# Patient Record
Sex: MALE | Race: WHITE | NOT HISPANIC OR LATINO | Employment: FULL TIME | URBAN - METROPOLITAN AREA
[De-identification: names, ages, dates, MRNs, and addresses within clinical notes are randomized per-mention and may not be internally consistent; named-entity substitution may affect disease eponyms.]

---

## 2024-11-07 ENCOUNTER — APPOINTMENT (OUTPATIENT)
Dept: CARDIOLOGY | Facility: HOSPITAL | Age: 29
End: 2024-11-07
Payer: COMMERCIAL

## 2024-11-07 ENCOUNTER — HOSPITAL ENCOUNTER (OUTPATIENT)
Facility: HOSPITAL | Age: 29
Setting detail: OBSERVATION
Discharge: HOME | End: 2024-11-08
Attending: EMERGENCY MEDICINE | Admitting: STUDENT IN AN ORGANIZED HEALTH CARE EDUCATION/TRAINING PROGRAM
Payer: COMMERCIAL

## 2024-11-07 ENCOUNTER — APPOINTMENT (OUTPATIENT)
Dept: RADIOLOGY | Facility: HOSPITAL | Age: 29
End: 2024-11-07
Payer: COMMERCIAL

## 2024-11-07 DIAGNOSIS — H55.00 NYSTAGMUS: ICD-10-CM

## 2024-11-07 DIAGNOSIS — R11.2 NAUSEA AND VOMITING, UNSPECIFIED VOMITING TYPE: ICD-10-CM

## 2024-11-07 DIAGNOSIS — R42 VERTIGO: Primary | ICD-10-CM

## 2024-11-07 PROBLEM — R14.0 ABDOMINAL BLOATING: Status: RESOLVED | Noted: 2022-08-22 | Resolved: 2024-11-07

## 2024-11-07 PROBLEM — L98.9 SKIN LESION OF HAND: Status: RESOLVED | Noted: 2021-06-03 | Resolved: 2024-11-07

## 2024-11-07 PROBLEM — R14.3 FLATULENCE: Status: RESOLVED | Noted: 2022-08-22 | Resolved: 2024-11-07

## 2024-11-07 PROBLEM — Z86.69 HX OF MIGRAINES: Status: ACTIVE | Noted: 2024-02-22

## 2024-11-07 PROBLEM — K21.9 GASTROESOPHAGEAL REFLUX DISEASE: Status: RESOLVED | Noted: 2022-08-22 | Resolved: 2024-11-07

## 2024-11-07 LAB
ALBUMIN SERPL BCP-MCNC: 4.7 G/DL (ref 3.4–5)
ALP SERPL-CCNC: 56 U/L (ref 33–120)
ALT SERPL W P-5'-P-CCNC: 13 U/L (ref 10–52)
ANION GAP SERPL CALC-SCNC: 13 MMOL/L (ref 10–20)
APPEARANCE UR: CLEAR
APTT PPP: 27 SECONDS (ref 27–38)
AST SERPL W P-5'-P-CCNC: 20 U/L (ref 9–39)
ATRIAL RATE: 66 BPM
BASOPHILS # BLD AUTO: 0.07 X10*3/UL (ref 0–0.1)
BASOPHILS NFR BLD AUTO: 1.2 %
BILIRUB SERPL-MCNC: 0.9 MG/DL (ref 0–1.2)
BILIRUB UR STRIP.AUTO-MCNC: NEGATIVE MG/DL
BUN SERPL-MCNC: 13 MG/DL (ref 6–23)
CALCIUM SERPL-MCNC: 9.2 MG/DL (ref 8.6–10.3)
CARDIAC TROPONIN I PNL SERPL HS: 3 NG/L (ref 0–20)
CARDIAC TROPONIN I PNL SERPL HS: 4 NG/L (ref 0–20)
CHLORIDE SERPL-SCNC: 104 MMOL/L (ref 98–107)
CO2 SERPL-SCNC: 24 MMOL/L (ref 21–32)
COLOR UR: NORMAL
CREAT SERPL-MCNC: 0.89 MG/DL (ref 0.5–1.3)
EGFRCR SERPLBLD CKD-EPI 2021: >90 ML/MIN/1.73M*2
EOSINOPHIL # BLD AUTO: 0.12 X10*3/UL (ref 0–0.7)
EOSINOPHIL NFR BLD AUTO: 2 %
ERYTHROCYTE [DISTWIDTH] IN BLOOD BY AUTOMATED COUNT: 11.4 % (ref 11.5–14.5)
FLUAV RNA RESP QL NAA+PROBE: NOT DETECTED
FLUBV RNA RESP QL NAA+PROBE: NOT DETECTED
GLUCOSE SERPL-MCNC: 162 MG/DL (ref 74–99)
GLUCOSE UR STRIP.AUTO-MCNC: NORMAL MG/DL
HCT VFR BLD AUTO: 46.1 % (ref 41–52)
HGB BLD-MCNC: 16 G/DL (ref 13.5–17.5)
IMM GRANULOCYTES # BLD AUTO: 0.04 X10*3/UL (ref 0–0.7)
IMM GRANULOCYTES NFR BLD AUTO: 0.7 % (ref 0–0.9)
INR PPP: 1.1 (ref 0.9–1.1)
KETONES UR STRIP.AUTO-MCNC: NEGATIVE MG/DL
LEUKOCYTE ESTERASE UR QL STRIP.AUTO: NEGATIVE
LIPASE SERPL-CCNC: 17 U/L (ref 9–82)
LYMPHOCYTES # BLD AUTO: 2.07 X10*3/UL (ref 1.2–4.8)
LYMPHOCYTES NFR BLD AUTO: 35.2 %
MCH RBC QN AUTO: 30.1 PG (ref 26–34)
MCHC RBC AUTO-ENTMCNC: 34.7 G/DL (ref 32–36)
MCV RBC AUTO: 87 FL (ref 80–100)
MONOCYTES # BLD AUTO: 0.4 X10*3/UL (ref 0.1–1)
MONOCYTES NFR BLD AUTO: 6.8 %
NEUTROPHILS # BLD AUTO: 3.18 X10*3/UL (ref 1.2–7.7)
NEUTROPHILS NFR BLD AUTO: 54.1 %
NITRITE UR QL STRIP.AUTO: NEGATIVE
NRBC BLD-RTO: 0 /100 WBCS (ref 0–0)
P AXIS: 52 DEGREES
P OFFSET: 199 MS
P ONSET: 142 MS
PH UR STRIP.AUTO: 7 [PH]
PLATELET # BLD AUTO: 238 X10*3/UL (ref 150–450)
POTASSIUM SERPL-SCNC: 3.8 MMOL/L (ref 3.5–5.3)
PR INTERVAL: 150 MS
PROT SERPL-MCNC: 7.3 G/DL (ref 6.4–8.2)
PROT UR STRIP.AUTO-MCNC: NEGATIVE MG/DL
PROTHROMBIN TIME: 12.6 SECONDS (ref 9.8–12.8)
Q ONSET: 217 MS
QRS COUNT: 10 BEATS
QRS DURATION: 96 MS
QT INTERVAL: 402 MS
QTC CALCULATION(BAZETT): 421 MS
QTC FREDERICIA: 415 MS
R AXIS: 67 DEGREES
RBC # BLD AUTO: 5.32 X10*6/UL (ref 4.5–5.9)
RBC # UR STRIP.AUTO: NEGATIVE /UL
RSV RNA RESP QL NAA+PROBE: NOT DETECTED
SARS-COV-2 RNA RESP QL NAA+PROBE: NOT DETECTED
SODIUM SERPL-SCNC: 137 MMOL/L (ref 136–145)
SP GR UR STRIP.AUTO: 1.02
T AXIS: 53 DEGREES
T OFFSET: 418 MS
UROBILINOGEN UR STRIP.AUTO-MCNC: NORMAL MG/DL
VENTRICULAR RATE: 66 BPM
WBC # BLD AUTO: 5.9 X10*3/UL (ref 4.4–11.3)

## 2024-11-07 PROCEDURE — 85025 COMPLETE CBC W/AUTO DIFF WBC: CPT | Performed by: EMERGENCY MEDICINE

## 2024-11-07 PROCEDURE — G0378 HOSPITAL OBSERVATION PER HR: HCPCS

## 2024-11-07 PROCEDURE — 2500000005 HC RX 250 GENERAL PHARMACY W/O HCPCS: Performed by: NURSE PRACTITIONER

## 2024-11-07 PROCEDURE — 70450 CT HEAD/BRAIN W/O DYE: CPT

## 2024-11-07 PROCEDURE — 81003 URINALYSIS AUTO W/O SCOPE: CPT

## 2024-11-07 PROCEDURE — 85610 PROTHROMBIN TIME: CPT

## 2024-11-07 PROCEDURE — 99285 EMERGENCY DEPT VISIT HI MDM: CPT | Performed by: EMERGENCY MEDICINE

## 2024-11-07 PROCEDURE — 96361 HYDRATE IV INFUSION ADD-ON: CPT

## 2024-11-07 PROCEDURE — 2500000001 HC RX 250 WO HCPCS SELF ADMINISTERED DRUGS (ALT 637 FOR MEDICARE OP): Performed by: NURSE PRACTITIONER

## 2024-11-07 PROCEDURE — 84484 ASSAY OF TROPONIN QUANT: CPT | Performed by: EMERGENCY MEDICINE

## 2024-11-07 PROCEDURE — 2500000004 HC RX 250 GENERAL PHARMACY W/ HCPCS (ALT 636 FOR OP/ED): Performed by: STUDENT IN AN ORGANIZED HEALTH CARE EDUCATION/TRAINING PROGRAM

## 2024-11-07 PROCEDURE — 2500000004 HC RX 250 GENERAL PHARMACY W/ HCPCS (ALT 636 FOR OP/ED)

## 2024-11-07 PROCEDURE — 96375 TX/PRO/DX INJ NEW DRUG ADDON: CPT

## 2024-11-07 PROCEDURE — 85730 THROMBOPLASTIN TIME PARTIAL: CPT

## 2024-11-07 PROCEDURE — 80053 COMPREHEN METABOLIC PANEL: CPT | Performed by: EMERGENCY MEDICINE

## 2024-11-07 PROCEDURE — 96374 THER/PROPH/DIAG INJ IV PUSH: CPT

## 2024-11-07 PROCEDURE — 83690 ASSAY OF LIPASE: CPT

## 2024-11-07 PROCEDURE — 2500000002 HC RX 250 W HCPCS SELF ADMINISTERED DRUGS (ALT 637 FOR MEDICARE OP, ALT 636 FOR OP/ED)

## 2024-11-07 PROCEDURE — 87637 SARSCOV2&INF A&B&RSV AMP PRB: CPT

## 2024-11-07 PROCEDURE — 99223 1ST HOSP IP/OBS HIGH 75: CPT | Performed by: NURSE PRACTITIONER

## 2024-11-07 PROCEDURE — 2500000004 HC RX 250 GENERAL PHARMACY W/ HCPCS (ALT 636 FOR OP/ED): Performed by: NURSE PRACTITIONER

## 2024-11-07 PROCEDURE — 99285 EMERGENCY DEPT VISIT HI MDM: CPT | Mod: 25

## 2024-11-07 PROCEDURE — 36415 COLL VENOUS BLD VENIPUNCTURE: CPT | Performed by: EMERGENCY MEDICINE

## 2024-11-07 PROCEDURE — 93005 ELECTROCARDIOGRAM TRACING: CPT

## 2024-11-07 PROCEDURE — 2500000002 HC RX 250 W HCPCS SELF ADMINISTERED DRUGS (ALT 637 FOR MEDICARE OP, ALT 636 FOR OP/ED): Performed by: NURSE PRACTITIONER

## 2024-11-07 RX ORDER — ONDANSETRON HYDROCHLORIDE 2 MG/ML
4 INJECTION, SOLUTION INTRAVENOUS EVERY 8 HOURS PRN
Status: DISCONTINUED | OUTPATIENT
Start: 2024-11-07 | End: 2024-11-08 | Stop reason: HOSPADM

## 2024-11-07 RX ORDER — CALCIUM CARBONATE 300MG(750)
400 TABLET,CHEWABLE ORAL DAILY
COMMUNITY

## 2024-11-07 RX ORDER — PROCHLORPERAZINE EDISYLATE 5 MG/ML
10 INJECTION INTRAMUSCULAR; INTRAVENOUS EVERY 6 HOURS PRN
Status: DISCONTINUED | OUTPATIENT
Start: 2024-11-07 | End: 2024-11-08 | Stop reason: HOSPADM

## 2024-11-07 RX ORDER — POLYETHYLENE GLYCOL 3350 17 G/17G
17 POWDER, FOR SOLUTION ORAL DAILY PRN
Status: DISCONTINUED | OUTPATIENT
Start: 2024-11-07 | End: 2024-11-08 | Stop reason: HOSPADM

## 2024-11-07 RX ORDER — ONDANSETRON HYDROCHLORIDE 2 MG/ML
INJECTION, SOLUTION INTRAVENOUS
Status: DISPENSED
Start: 2024-11-07 | End: 2024-11-07

## 2024-11-07 RX ORDER — PROCHLORPERAZINE EDISYLATE 5 MG/ML
10 INJECTION INTRAMUSCULAR; INTRAVENOUS ONCE
Status: DISCONTINUED | OUTPATIENT
Start: 2024-11-07 | End: 2024-11-08 | Stop reason: HOSPADM

## 2024-11-07 RX ORDER — ACETAMINOPHEN 325 MG/1
650 TABLET ORAL EVERY 4 HOURS PRN
Status: DISCONTINUED | OUTPATIENT
Start: 2024-11-07 | End: 2024-11-08 | Stop reason: HOSPADM

## 2024-11-07 RX ORDER — ONDANSETRON 4 MG/1
4 TABLET, FILM COATED ORAL EVERY 8 HOURS PRN
Status: DISCONTINUED | OUTPATIENT
Start: 2024-11-07 | End: 2024-11-08 | Stop reason: HOSPADM

## 2024-11-07 RX ORDER — PREDNISONE 50 MG/1
50 TABLET ORAL DAILY
Status: DISCONTINUED | OUTPATIENT
Start: 2024-11-07 | End: 2024-11-08

## 2024-11-07 RX ORDER — PROCHLORPERAZINE 25 MG/1
25 SUPPOSITORY RECTAL EVERY 12 HOURS PRN
Status: DISCONTINUED | OUTPATIENT
Start: 2024-11-07 | End: 2024-11-08 | Stop reason: HOSPADM

## 2024-11-07 RX ORDER — MECLIZINE HYDROCHLORIDE 25 MG/1
25 TABLET ORAL ONCE
Status: DISCONTINUED | OUTPATIENT
Start: 2024-11-07 | End: 2024-11-07

## 2024-11-07 RX ORDER — ACETAMINOPHEN 160 MG/5ML
650 SOLUTION ORAL EVERY 4 HOURS PRN
Status: DISCONTINUED | OUTPATIENT
Start: 2024-11-07 | End: 2024-11-08 | Stop reason: HOSPADM

## 2024-11-07 RX ORDER — MECLIZINE HYDROCHLORIDE 25 MG/1
25 TABLET ORAL ONCE
Status: COMPLETED | OUTPATIENT
Start: 2024-11-07 | End: 2024-11-07

## 2024-11-07 RX ORDER — LORAZEPAM 0.5 MG/1
0.5 TABLET ORAL EVERY 8 HOURS PRN
Status: DISCONTINUED | OUTPATIENT
Start: 2024-11-07 | End: 2024-11-08

## 2024-11-07 RX ORDER — MECLIZINE HYDROCHLORIDE 25 MG/1
25 TABLET ORAL EVERY 8 HOURS
Status: DISCONTINUED | OUTPATIENT
Start: 2024-11-07 | End: 2024-11-08 | Stop reason: HOSPADM

## 2024-11-07 RX ORDER — PROCHLORPERAZINE MALEATE 10 MG
10 TABLET ORAL EVERY 6 HOURS PRN
Status: DISCONTINUED | OUTPATIENT
Start: 2024-11-07 | End: 2024-11-08 | Stop reason: HOSPADM

## 2024-11-07 RX ORDER — DIAZEPAM 5 MG/ML
2.5 INJECTION, SOLUTION INTRAMUSCULAR; INTRAVENOUS ONCE
Status: COMPLETED | OUTPATIENT
Start: 2024-11-07 | End: 2024-11-07

## 2024-11-07 SDOH — SOCIAL STABILITY: SOCIAL INSECURITY
WITHIN THE LAST YEAR, HAVE YOU BEEN RAPED OR FORCED TO HAVE ANY KIND OF SEXUAL ACTIVITY BY YOUR PARTNER OR EX-PARTNER?: PATIENT DECLINED

## 2024-11-07 SDOH — SOCIAL STABILITY: SOCIAL INSECURITY: WERE YOU ABLE TO COMPLETE ALL THE BEHAVIORAL HEALTH SCREENINGS?: NO

## 2024-11-07 SDOH — SOCIAL STABILITY: SOCIAL INSECURITY: DO YOU FEEL ANYONE HAS EXPLOITED OR TAKEN ADVANTAGE OF YOU FINANCIALLY OR OF YOUR PERSONAL PROPERTY?: NO

## 2024-11-07 SDOH — SOCIAL STABILITY: SOCIAL INSECURITY: DO YOU FEEL UNSAFE GOING BACK TO THE PLACE WHERE YOU ARE LIVING?: NO

## 2024-11-07 SDOH — SOCIAL STABILITY: SOCIAL INSECURITY: WITHIN THE LAST YEAR, HAVE YOU BEEN AFRAID OF YOUR PARTNER OR EX-PARTNER?: PATIENT DECLINED

## 2024-11-07 SDOH — ECONOMIC STABILITY: FOOD INSECURITY: HOW HARD IS IT FOR YOU TO PAY FOR THE VERY BASICS LIKE FOOD, HOUSING, MEDICAL CARE, AND HEATING?: PATIENT DECLINED

## 2024-11-07 SDOH — ECONOMIC STABILITY: HOUSING INSECURITY: AT ANY TIME IN THE PAST 12 MONTHS, WERE YOU HOMELESS OR LIVING IN A SHELTER (INCLUDING NOW)?: PATIENT DECLINED

## 2024-11-07 SDOH — ECONOMIC STABILITY: TRANSPORTATION INSECURITY
IN THE PAST 12 MONTHS, HAS LACK OF TRANSPORTATION KEPT YOU FROM MEDICAL APPOINTMENTS OR FROM GETTING MEDICATIONS?: PATIENT DECLINED

## 2024-11-07 SDOH — ECONOMIC STABILITY: INCOME INSECURITY
IN THE PAST 12 MONTHS HAS THE ELECTRIC, GAS, OIL, OR WATER COMPANY THREATENED TO SHUT OFF SERVICES IN YOUR HOME?: PATIENT DECLINED

## 2024-11-07 SDOH — SOCIAL STABILITY: SOCIAL INSECURITY: ARE THERE ANY APPARENT SIGNS OF INJURIES/BEHAVIORS THAT COULD BE RELATED TO ABUSE/NEGLECT?: NO

## 2024-11-07 SDOH — ECONOMIC STABILITY: HOUSING INSECURITY: IN THE PAST 12 MONTHS, HOW MANY TIMES HAVE YOU MOVED WHERE YOU WERE LIVING?: 0

## 2024-11-07 SDOH — ECONOMIC STABILITY: HOUSING INSECURITY: IN THE LAST 12 MONTHS, WAS THERE A TIME WHEN YOU WERE NOT ABLE TO PAY THE MORTGAGE OR RENT ON TIME?: PATIENT DECLINED

## 2024-11-07 SDOH — SOCIAL STABILITY: SOCIAL INSECURITY: HAVE YOU HAD THOUGHTS OF HARMING ANYONE ELSE?: YES

## 2024-11-07 SDOH — SOCIAL STABILITY: SOCIAL INSECURITY: HAS ANYONE EVER THREATENED TO HURT YOUR FAMILY OR YOUR PETS?: NO

## 2024-11-07 SDOH — ECONOMIC STABILITY: FOOD INSECURITY: WITHIN THE PAST 12 MONTHS, THE FOOD YOU BOUGHT JUST DIDN'T LAST AND YOU DIDN'T HAVE MONEY TO GET MORE.: PATIENT DECLINED

## 2024-11-07 SDOH — SOCIAL STABILITY: SOCIAL INSECURITY: ARE YOU OR HAVE YOU BEEN THREATENED OR ABUSED PHYSICALLY, EMOTIONALLY, OR SEXUALLY BY ANYONE?: NO

## 2024-11-07 SDOH — ECONOMIC STABILITY: FOOD INSECURITY
WITHIN THE PAST 12 MONTHS, YOU WORRIED THAT YOUR FOOD WOULD RUN OUT BEFORE YOU GOT THE MONEY TO BUY MORE.: PATIENT DECLINED

## 2024-11-07 SDOH — SOCIAL STABILITY: SOCIAL INSECURITY: DOES ANYONE TRY TO KEEP YOU FROM HAVING/CONTACTING OTHER FRIENDS OR DOING THINGS OUTSIDE YOUR HOME?: NO

## 2024-11-07 SDOH — SOCIAL STABILITY: SOCIAL INSECURITY
WITHIN THE LAST YEAR, HAVE YOU BEEN KICKED, HIT, SLAPPED, OR OTHERWISE PHYSICALLY HURT BY YOUR PARTNER OR EX-PARTNER?: PATIENT DECLINED

## 2024-11-07 SDOH — SOCIAL STABILITY: SOCIAL INSECURITY: ABUSE: ADULT

## 2024-11-07 SDOH — SOCIAL STABILITY: SOCIAL INSECURITY
WITHIN THE LAST YEAR, HAVE YOU BEEN HUMILIATED OR EMOTIONALLY ABUSED IN OTHER WAYS BY YOUR PARTNER OR EX-PARTNER?: PATIENT DECLINED

## 2024-11-07 ASSESSMENT — PATIENT HEALTH QUESTIONNAIRE - PHQ9
SUM OF ALL RESPONSES TO PHQ9 QUESTIONS 1 & 2: 0
2. FEELING DOWN, DEPRESSED OR HOPELESS: NOT AT ALL
1. LITTLE INTEREST OR PLEASURE IN DOING THINGS: NOT AT ALL

## 2024-11-07 ASSESSMENT — LIFESTYLE VARIABLES
HOW MANY STANDARD DRINKS CONTAINING ALCOHOL DO YOU HAVE ON A TYPICAL DAY: 1 OR 2
HOW OFTEN DO YOU HAVE A DRINK CONTAINING ALCOHOL: 2-3 TIMES A WEEK
HAVE YOU EVER FELT YOU SHOULD CUT DOWN ON YOUR DRINKING: NO
AUDIT-C TOTAL SCORE: 6
AUDIT-C TOTAL SCORE: 6
EVER HAD A DRINK FIRST THING IN THE MORNING TO STEADY YOUR NERVES TO GET RID OF A HANGOVER: NO
SKIP TO QUESTIONS 9-10: 0
HOW OFTEN DO YOU HAVE 6 OR MORE DRINKS ON ONE OCCASION: WEEKLY
EVER FELT BAD OR GUILTY ABOUT YOUR DRINKING: NO
TOTAL SCORE: 0
HAVE PEOPLE ANNOYED YOU BY CRITICIZING YOUR DRINKING: NO

## 2024-11-07 ASSESSMENT — ENCOUNTER SYMPTOMS
SORE THROAT: 0
SINUS PRESSURE: 0
WEAKNESS: 0
PSYCHIATRIC NEGATIVE: 1
HEADACHES: 0
MUSCULOSKELETAL NEGATIVE: 1
RESPIRATORY NEGATIVE: 1
CARDIOVASCULAR NEGATIVE: 1
CONSTITUTIONAL NEGATIVE: 1
RHINORRHEA: 0
SPEECH DIFFICULTY: 0
LIGHT-HEADEDNESS: 0
GASTROINTESTINAL NEGATIVE: 1
DIZZINESS: 1
EYES NEGATIVE: 1

## 2024-11-07 ASSESSMENT — PAIN SCALES - GENERAL
PAINLEVEL_OUTOF10: 4
PAINLEVEL_OUTOF10: 0 - NO PAIN

## 2024-11-07 ASSESSMENT — COGNITIVE AND FUNCTIONAL STATUS - GENERAL
DAILY ACTIVITIY SCORE: 24
MOBILITY SCORE: 24
PATIENT BASELINE BEDBOUND: NO
PATIENT BASELINE BEDBOUND: NO

## 2024-11-07 ASSESSMENT — ACTIVITIES OF DAILY LIVING (ADL)
FEEDING YOURSELF: INDEPENDENT
ADEQUATE_TO_COMPLETE_ADL: YES
TOILETING: INDEPENDENT
BATHING: INDEPENDENT
HEARING - LEFT EAR: FUNCTIONAL
WALKS IN HOME: INDEPENDENT
LACK_OF_TRANSPORTATION: PATIENT DECLINED
LACK_OF_TRANSPORTATION: PATIENT DECLINED
DRESSING YOURSELF: INDEPENDENT
PATIENT'S MEMORY ADEQUATE TO SAFELY COMPLETE DAILY ACTIVITIES?: YES
HEARING - RIGHT EAR: FUNCTIONAL
GROOMING: INDEPENDENT
JUDGMENT_ADEQUATE_SAFELY_COMPLETE_DAILY_ACTIVITIES: YES

## 2024-11-07 ASSESSMENT — PAIN DESCRIPTION - PAIN TYPE: TYPE: ACUTE PAIN

## 2024-11-07 ASSESSMENT — PAIN - FUNCTIONAL ASSESSMENT: PAIN_FUNCTIONAL_ASSESSMENT: 0-10

## 2024-11-07 NOTE — CARE PLAN
Problem: Pain - Adult  Goal: Verbalizes/displays adequate comfort level or baseline comfort level  Outcome: Progressing     Problem: Safety - Adult  Goal: Free from fall injury  Outcome: Progressing     Problem: Discharge Planning  Goal: Discharge to home or other facility with appropriate resources  Outcome: Progressing     Problem: Chronic Conditions and Co-morbidities  Goal: Patient's chronic conditions and co-morbidity symptoms are monitored and maintained or improved  Outcome: Progressing     Problem: Fall/Injury  Goal: Not fall by end of shift  Outcome: Progressing  Goal: Be free from injury by end of the shift  Outcome: Progressing  Goal: Verbalize understanding of personal risk factors for fall in the hospital  Outcome: Progressing  Goal: Verbalize understanding of risk factor reduction measures to prevent injury from fall in the home  Outcome: Progressing  Goal: Use assistive devices by end of the shift  Outcome: Progressing  Goal: Pace activities to prevent fatigue by end of the shift  Outcome: Progressing     Problem: General Stroke  Goal: Establish a mutual long term goal with patient by discharge  Outcome: Progressing  Goal: Demonstrate improvement in neurological exam throughout the shift  Outcome: Progressing  Goal: Maintain BP within ordered limits throughout shift  Outcome: Progressing  Goal: Participate in treatment (ie., meds, therapy) throughout shift  Outcome: Progressing  Goal: No symptoms of aspiration throughout shift  Outcome: Progressing  Goal: No symptoms of hemorrhage throughout shift  Outcome: Progressing  Goal: Tolerate enteral feeding throughout shift  Outcome: Progressing  Goal: Decreased nausea/vomiting throughout shift  Outcome: Progressing  Goal: Controlled blood glucose throughout shift  Outcome: Progressing  Goal: Out of bed three times today  Outcome: Progressing   The patient's goals for the shift include      The clinical goals for the shift include no further stroke  sx's

## 2024-11-07 NOTE — H&P
"History Of Present Illness  Ishaan Bose is a 29 y.o. male with no medical history other than recent diagnosis with PVCs who as of yet to follow-up with cardiology who arrives to the emergency department with a chief complaint of vertiginous symptoms including room spinning, nausea, vomiting.  He states it started when he was at work today. He states the dizziness/room spinning described as a \"whooshing\" feeling as constant, worse with movement. Patient states he had several episodes of emesis prior to ED arrival along with diarrhea.  Patient denies chest pain or shortness of breath, denies changes in bowel or bladder, abd pain.  He also endorses a recent cold in the past 2-3 weeks and today on arrival to the medical floor feels a \"pressure\" in his left ear/neck.  He arrives alert and oriented x 3, NIH 0, GCS 15 however he does have a present rightward nystagmus. Neurology was consulted in ED and Dr. Jim would like MRI.      Past Medical History  Heart PVC's, anxiety, ocular migraine    Surgical History  He has no past surgical history on file.     Social History  2-4 alcoholic beverages 4 times a week. Occasional vape    Family History  No family history on file.     Allergies  Adhesive tape-silicones    Review of Systems   Constitutional: Negative.    HENT:  Negative for congestion, rhinorrhea, sinus pressure and sore throat.    Eyes: Negative.    Respiratory: Negative.     Cardiovascular: Negative.    Gastrointestinal: Negative.    Genitourinary: Negative.    Musculoskeletal: Negative.    Skin: Negative.    Neurological:  Positive for dizziness. Negative for speech difficulty, weakness, light-headedness and headaches.   Psychiatric/Behavioral: Negative.        ROS: 12 systems reviewed and negative except per HPI above     Physical Exam by System:     Constitutional: Well developed, awake/alert/oriented x3, no distress, alert and cooperative   ENMT: mucous membranes moist   Head/Neck: Neck supple " "  Respiratory/Thorax: Patent airways, CTAB, normal breath sounds with good chest expansion, thorax symmetric   Cardiovascular: Regular, rate and rhythm, no murmurs, 2+ equal pulses of the extremities, normal S 1and S 2   Gastrointestinal: Nondistended, soft, non-tender, no rebound tenderness or guarding, no masses palpable, no organomegaly, +BS, no bruits   Musculoskeletal: ROM intact, no joint swelling, normal strength   Extremities: normal extremities, no cyanosis edema, contusions or wounds, no clubbing   Neurological: alert and oriented x3, intact senses, motor, response and reflexes, normal strength. rightward beating nystagmus NIH 0       Last Recorded Vitals  Blood pressure 131/70, pulse 64, temperature 36.2 °C (97.2 °F), temperature source Temporal, resp. rate 18, height 1.753 m (5' 9\"), weight 68 kg (150 lb), SpO2 100%.    Relevant Results  Results for orders placed or performed during the hospital encounter of 11/07/24 (from the past 24 hours)   ECG 12 lead   Result Value Ref Range    Ventricular Rate 66 BPM    Atrial Rate 66 BPM    MA Interval 150 ms    QRS Duration 96 ms    QT Interval 402 ms    QTC Calculation(Bazett) 421 ms    P Axis 52 degrees    R Axis 67 degrees    T Axis 53 degrees    QRS Count 10 beats    Q Onset 217 ms    P Onset 142 ms    P Offset 199 ms    T Offset 418 ms    QTC Fredericia 415 ms   CBC with Differential   Result Value Ref Range    WBC 5.9 4.4 - 11.3 x10*3/uL    nRBC 0.0 0.0 - 0.0 /100 WBCs    RBC 5.32 4.50 - 5.90 x10*6/uL    Hemoglobin 16.0 13.5 - 17.5 g/dL    Hematocrit 46.1 41.0 - 52.0 %    MCV 87 80 - 100 fL    MCH 30.1 26.0 - 34.0 pg    MCHC 34.7 32.0 - 36.0 g/dL    RDW 11.4 (L) 11.5 - 14.5 %    Platelets 238 150 - 450 x10*3/uL    Neutrophils % 54.1 40.0 - 80.0 %    Immature Granulocytes %, Automated 0.7 0.0 - 0.9 %    Lymphocytes % 35.2 13.0 - 44.0 %    Monocytes % 6.8 2.0 - 10.0 %    Eosinophils % 2.0 0.0 - 6.0 %    Basophils % 1.2 0.0 - 2.0 %    Neutrophils Absolute 3.18 " 1.20 - 7.70 x10*3/uL    Immature Granulocytes Absolute, Automated 0.04 0.00 - 0.70 x10*3/uL    Lymphocytes Absolute 2.07 1.20 - 4.80 x10*3/uL    Monocytes Absolute 0.40 0.10 - 1.00 x10*3/uL    Eosinophils Absolute 0.12 0.00 - 0.70 x10*3/uL    Basophils Absolute 0.07 0.00 - 0.10 x10*3/uL   Comprehensive Metabolic Panel   Result Value Ref Range    Glucose 162 (H) 74 - 99 mg/dL    Sodium 137 136 - 145 mmol/L    Potassium 3.8 3.5 - 5.3 mmol/L    Chloride 104 98 - 107 mmol/L    Bicarbonate 24 21 - 32 mmol/L    Anion Gap 13 10 - 20 mmol/L    Urea Nitrogen 13 6 - 23 mg/dL    Creatinine 0.89 0.50 - 1.30 mg/dL    eGFR >90 >60 mL/min/1.73m*2    Calcium 9.2 8.6 - 10.3 mg/dL    Albumin 4.7 3.4 - 5.0 g/dL    Alkaline Phosphatase 56 33 - 120 U/L    Total Protein 7.3 6.4 - 8.2 g/dL    AST 20 9 - 39 U/L    Bilirubin, Total 0.9 0.0 - 1.2 mg/dL    ALT 13 10 - 52 U/L   Troponin I, High Sensitivity, Initial   Result Value Ref Range    Troponin I, High Sensitivity 4 0 - 20 ng/L   Lipase   Result Value Ref Range    Lipase 17 9 - 82 U/L   Troponin, High Sensitivity, 1 Hour   Result Value Ref Range    Troponin I, High Sensitivity 3 0 - 20 ng/L   Protime-INR   Result Value Ref Range    Protime 12.6 9.8 - 12.8 seconds    INR 1.1 0.9 - 1.1   APTT   Result Value Ref Range    aPTT 27 27 - 38 seconds   Sars-CoV-2 PCR   Result Value Ref Range    Coronavirus 2019, PCR Not Detected Not Detected   Influenza A, and B PCR   Result Value Ref Range    Flu A Result Not Detected Not Detected    Flu B Result Not Detected Not Detected   RSV PCR   Result Value Ref Range    RSV PCR Not Detected Not Detected      Scheduled medications  ondansetron, , ,       Continuous medications     PRN medications  PRN medications: ondansetron     ECG 12 lead    Result Date: 11/7/2024  Sinus rhythm with occasional Premature ventricular complexes Otherwise normal ECG No previous ECGs available    CT brain attack head wo IV contrast    Result Date: 11/7/2024  Interpreted By:   Sarah Velez, STUDY: CT BRAIN ATTACK HEAD WO IV CONTRAST;  11/7/2024 12:40 pm   INDICATION: Signs/Symptoms:Patient has a vertical nystagmus, new onset vertigo within 4 hours.     COMPARISON: None.   ACCESSION NUMBER(S): [id number]   ORDERING CLINICIAN: LATOSHA HIGGINBOTHAM   TECHNIQUE: Noncontrast axial CT scan of head was performed. Angled reformats in brain and bone windows were generated. The images were reviewed in bone, brain, blood and soft tissue windows.   FINDINGS: CSF Spaces: The ventricles, sulci and basal cisterns are within normal limits. There is no extraaxial fluid collection.   Parenchyma:  The grey-white differentiation is intact. There is no mass effect or midline shift.  There is no intracranial hemorrhage.   Calvarium: The calvarium is unremarkable.   Paranasal sinuses and mastoids: Visualized paranasal sinuses and mastoids are clear.       No evidence of acute cortical infarct or intracranial hemorrhage.   No evidence of intracranial hemorrhage or displaced skull fracture.   MACRO: Sarah Velez discussed the significance and urgency of this critical finding by telephone with  LATOSHA HIGGINBOTHAM on 11/7/2024 at 12:49 pm.  (**-RCF-**) Findings:  See findings.     Signed by: Sarah Velez 11/7/2024 12:49 PM Dictation workstation:   [id number]         Assessment/Plan   Vertigo  Vestibular neuritis  Nausea vomiting  Rule out CVA  History of ocular migraines  History  of anxiety    Plan:    Admit to observation with tele  Neurology placed on consult Dr. Jim  CT brain attack-No evidence of acute cortical infarct or intracranial hemorrhage.   Obtain MRI brain  Zofran PRN for nausea vomiting  Meclizine q 8 hours scheduled  Ativan 0.5 mg PRN every 8 hours dizziness  Prednisone 50 mg daily x 5 days  Received meclizine and valium along with 1000 ml of LR in ED  CBC and BMP unremarkable no need to repeat  Influenza A B RSV and covid not detected  No medications to resume  POC dicussed with patient and  attending who is at bedside completing bedside examination  Dispo: likely require less than 2 midnight stays to adequately treat and safe dc plan, to home when cleared by neurology    Code status: Full Code    I spent >50 minutes in the professional and overall care of this patient.    SIGNATURE: MART Marshall-BENJAMIN PATIENT NAME: Ishaan Bose   DATE: November 7, 2024 MRN: [id number]   TIME: 2:46 PM

## 2024-11-07 NOTE — ED PROVIDER NOTES
Emergency Department Provider Note        History of Present Illness     History provided by: Patient and Parent  Limitations to History: None  External Records Reviewed with Brief Summary: None    HPI:  Ishaan Bose is a 29 y.o. male this is a 29-year-old male with no medical history other than recent diagnosis with PVCs who as of yet to follow-up with cardiology who arrives to the emergency department with a chief complaint of vertiginous symptoms including room spinning, nausea, vomiting.  Patient states he vomited approximately 10 times prior to arrival.  He states he had 1 time in the past where he also felt vertiginous symptoms approximately a year ago when he was diagnosed with influenza however was not as severe as this episode.  He endorses nausea and vomiting, denies chest pain or shortness of breath, denies changes in bowel or bladder, denies blood in stool or urine, denies changes in bowel or urinary habits.  He states that this began at approximately 9 AM this morning, placing him within the window for tenecteplase and stroke protocol.  He arrives alert and oriented x 3, NIH 0, GCS 15 however he does have a present rightward nystagmus.    Physical Exam   Triage vitals:  T 36.2 °C (97.2 °F)  HR 72  /85  RR 20  O2 97 % None (Room air)    General: Awake, alert, in no acute distress, appears pale, actively vomiting  Eyes: Gaze conjugate.  No scleral icterus or injection  HENT: Normo-cephalic, atraumatic. No stridor  CV: Regular rate, regular rhythm. Radial pulses 2+ bilaterally  Resp: Breathing non-labored, speaking in full sentences.  Clear to auscultation bilaterally  GI: Soft, non-distended, non-tender. No rebound or guarding.  : Deferred  MSK/Extremities: No gross bony deformities. Moving all extremities  Skin: Warm. Appropriate color  Neuro: Alert. Oriented. Face symmetric. Speech is fluent.  Gross strength and sensation intact in b/l UE and LEs, NIH 0, cranial nerves II through XII  grossly intact, normal finger-to-nose, no dysdiadochokinesia, he does have a rightward beating nystagmus, hints exam shows no signs of a central vertigo  Psych: Appropriate mood and affect    Medical Decision Making & ED Course   Medical Decision Makin y.o. male arrives with a chief complaint of nausea vomiting and vertiginous symptoms with a rightward beating nystagmus.  As the patient was within a 4-hour window since arrival a brain attack was called, patient sent to CT, was discussed with Dr. Jim on-call neurologist.  He was given a dose of Zofran for nausea, CBC CMP troponin, twelve-lead ECG were ordered.  He was given 25 mg of meclizine.  Patient continued to have episodes of nausea and vertiginous symptoms.  He was given 2.5 mg of diazepam for vertiginous symptoms.  Tested for influenza COVID RSV, all nasal swabs were negative.  Troponin with delta over 4 and 3 respectively, rule out ACS.  CBC CMP unremarkable.  We did discuss with neurology on call potential of admission to the hospital for MRI and further neurological workup.  This was discussed with the patient and his family.  He was amenable for admission.  Please see ED course for further medical decision making.  ----  Scoring Tools Utilized: Selina Coma Scale Score: 15     NIH Stroke Scale: 0       Differential diagnoses considered include but are not limited to: Peripheral vertigo, intracranial hemorrhage, subarachnoid hemorrhage, central vertigo, labyrinthitis, Ménière's disease     Social Determinants of Health which Significantly Impact Care: None identified     EKG Independent Interpretation: EKG interpreted by myself. Please see ED Course for full interpretation.    Independent Result Review and Interpretation: Relevant laboratory and radiographic results were reviewed and independently interpreted by myself.  As necessary, they are commented on in the ED Course.    Chronic conditions affecting the patient's care: As documented above in  MDM    The patient was discussed with the following consultants/services: Dr. Jim, neurologist on-call and Hospitalist/Admitting Provider who accepted the patient for admission    Care Considerations: None    ED Course:  ED Course as of 11/07/24 1305   Thu Nov 07, 2024   1238 At this time I spoke with Dr. Jim, neurologist on-call.  He agrees that the patient is not a likely TNK candidate due to the lack of other symptomology or debilitating illness other than rightward beating nystagmus at this time.  We will plan on admitting the patient to this facility for further neurological workup including MRI at a later time.  Patient's glucose was stable at 162, no signs of acute blood loss or anemia, no leukocytosis to indicate systemic infection.  The patient does have history of PVCs and is scheduled to be seen by cardiology but has not as of yet.  His troponin was negative at 4, ECG shows occasional PVCs however no acute injury pattern.  Patient was given 50 mg of meclizine, additionally 4 mg of Zofran for nausea or vomiting.  IV fluids were started due to the patient's appearance of appearing pale and recent history of multiple episodes of emesis over 10 times in the last 3 hours.   [PV]      ED Course User Index  [PV] Juancarlos Burr DO     Disposition   As a result of their workup, the patient will require admission to the hospital.  The patient was informed of his diagnosis.  The patient was given the opportunity to ask questions and I answered them. The patient agreed to be admitted to the hospital.    Procedures   Procedures    Patient seen and discussed with ED attending physician.    Juancarlos Burr DO  Emergency Medicine    =================Attending note===============    The patient was seen by the resident/fellow.  I have personally performed a substantive portion of the encounter.  I have seen and examined the patient; agree with the workup, evaluation, MDM,   management and diagnosis.  The care plan  has been discussed with the resident; I have reviewed the resident’s note and agree with the documented findings.      This is a 29 y.o. 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  He describes a spinning type dizziness.  He had nausea and vomited vomit around 10 times.  He did have 2 or 3 episodes of diarrhea.  He did feel his eyes fluttering.  No fevers but he did feel diaphoretic with this.  No chest pain or shortness of breath or abdominal pain.  No head injuries.  He states a couple years ago had a similar episode when he had the flu.  He did have a viral URI symptoms a few weeks ago.  His only medical history is ocular migraines and PVC.    He does have horizontal nystagmus that is to the right.  No facial asymmetry.  No carotid bruit.  No ataxia finger-to-nose or heel-to-shin.  Visual fields intact.  His only neurologic deficit is the vertigo and nystagmus.  Heart regular.  Lungs clear.  Abdomen soft and nontender.            ==========================================      Juancarlos Burr,   Resident  11/07/24 [id number]

## 2024-11-08 ENCOUNTER — APPOINTMENT (OUTPATIENT)
Dept: RADIOLOGY | Facility: HOSPITAL | Age: 29
End: 2024-11-08
Payer: COMMERCIAL

## 2024-11-08 VITALS
HEIGHT: 71 IN | SYSTOLIC BLOOD PRESSURE: 134 MMHG | BODY MASS INDEX: 19.14 KG/M2 | OXYGEN SATURATION: 96 % | DIASTOLIC BLOOD PRESSURE: 71 MMHG | RESPIRATION RATE: 16 BRPM | HEART RATE: 75 BPM | WEIGHT: 136.69 LBS | TEMPERATURE: 97 F

## 2024-11-08 LAB — HOLD SPECIMEN: NORMAL

## 2024-11-08 PROCEDURE — 70551 MRI BRAIN STEM W/O DYE: CPT | Performed by: RADIOLOGY

## 2024-11-08 PROCEDURE — 2500000004 HC RX 250 GENERAL PHARMACY W/ HCPCS (ALT 636 FOR OP/ED): Performed by: NURSE PRACTITIONER

## 2024-11-08 PROCEDURE — 99238 HOSP IP/OBS DSCHRG MGMT 30/<: CPT | Performed by: INTERNAL MEDICINE

## 2024-11-08 PROCEDURE — G0378 HOSPITAL OBSERVATION PER HR: HCPCS

## 2024-11-08 PROCEDURE — 70551 MRI BRAIN STEM W/O DYE: CPT

## 2024-11-08 PROCEDURE — 2500000001 HC RX 250 WO HCPCS SELF ADMINISTERED DRUGS (ALT 637 FOR MEDICARE OP): Performed by: INTERNAL MEDICINE

## 2024-11-08 PROCEDURE — 2500000002 HC RX 250 W HCPCS SELF ADMINISTERED DRUGS (ALT 637 FOR MEDICARE OP, ALT 636 FOR OP/ED): Performed by: NURSE PRACTITIONER

## 2024-11-08 PROCEDURE — 2500000004 HC RX 250 GENERAL PHARMACY W/ HCPCS (ALT 636 FOR OP/ED): Performed by: INTERNAL MEDICINE

## 2024-11-08 RX ORDER — MECLIZINE HYDROCHLORIDE 25 MG/1
25 TABLET ORAL 3 TIMES DAILY PRN
Qty: 60 TABLET | Refills: 0 | Status: SHIPPED | OUTPATIENT
Start: 2024-11-08 | End: 2024-12-08

## 2024-11-08 RX ORDER — ONDANSETRON 4 MG/1
4 TABLET, FILM COATED ORAL EVERY 8 HOURS PRN
Qty: 20 TABLET | Refills: 0 | Status: SHIPPED | OUTPATIENT
Start: 2024-11-08

## 2024-11-08 RX ORDER — LORAZEPAM 0.5 MG/1
0.5 TABLET ORAL EVERY 6 HOURS PRN
Status: DISCONTINUED | OUTPATIENT
Start: 2024-11-08 | End: 2024-11-08 | Stop reason: HOSPADM

## 2024-11-08 RX ORDER — SCOLOPAMINE TRANSDERMAL SYSTEM 1 MG/1
1 PATCH, EXTENDED RELEASE TRANSDERMAL
Qty: 10 PATCH | Refills: 0 | Status: SHIPPED | OUTPATIENT
Start: 2024-11-11 | End: 2024-12-11

## 2024-11-08 RX ORDER — SCOLOPAMINE TRANSDERMAL SYSTEM 1 MG/1
1 PATCH, EXTENDED RELEASE TRANSDERMAL
Status: DISCONTINUED | OUTPATIENT
Start: 2024-11-08 | End: 2024-11-08 | Stop reason: HOSPADM

## 2024-11-08 RX ORDER — LORAZEPAM 0.5 MG/1
0.5 TABLET ORAL EVERY 6 HOURS PRN
Qty: 15 TABLET | Refills: 0 | Status: SHIPPED | OUTPATIENT
Start: 2024-11-08

## 2024-11-08 ASSESSMENT — COGNITIVE AND FUNCTIONAL STATUS - GENERAL
WALKING IN HOSPITAL ROOM: A LITTLE
DAILY ACTIVITIY SCORE: 23
MOBILITY SCORE: 22
TOILETING: A LITTLE
CLIMB 3 TO 5 STEPS WITH RAILING: A LITTLE

## 2024-11-08 ASSESSMENT — PAIN SCALES - GENERAL
PAINLEVEL_OUTOF10: 0 - NO PAIN
PAINLEVEL_OUTOF10: 0 - NO PAIN

## 2024-11-08 NOTE — CARE PLAN
The patient's goals for the shift include      The clinical goals for the shift include no further n/v    No nausea and vomiting noted for ten hours of this shift after medicated with Compazine 10 mg iv.      Problem: Chronic Conditions and Co-morbidities  Goal: Patient's chronic conditions and co-morbidity symptoms are monitored and maintained or improved  11/8/2024 0505 by Karina Watts RN  Outcome: Progressing  11/8/2024 0504 by Karina Watts RN  Outcome: Progressing     Problem: Pain - Adult  Goal: Verbalizes/displays adequate comfort level or baseline comfort level  11/8/2024 0505 by Karina Watts RN  Outcome: Progressing  11/8/2024 0504 by Karina Watts RN  Outcome: Progressing     Problem: Safety - Adult  Goal: Free from fall injury  11/8/2024 0505 by Karina Watts RN  Outcome: Progressing  11/8/2024 0504 by Karina Watts RN  Outcome: Progressing     Problem: Discharge Planning  Goal: Discharge to home or other facility with appropriate resources  11/8/2024 0505 by Karina Watts RN  Outcome: Progressing  11/8/2024 0504 by Karina Watts RN  Outcome: Progressing     Problem: Chronic Conditions and Co-morbidities  Goal: Patient's chronic conditions and co-morbidity symptoms are monitored and maintained or improved  11/8/2024 0505 by Karina Watts RN  Outcome: Progressing  11/8/2024 0504 by Karina Watts RN  Outcome: Progressing     Problem: Fall/Injury  Goal: Not fall by end of shift  11/8/2024 0505 by Karina Watts RN  Outcome: Progressing  11/8/2024 0504 by Karina Watts RN  Outcome: Progressing  Goal: Be free from injury by end of the shift  11/8/2024 0505 by Karina Watts RN  Outcome: Progressing  11/8/2024 0504 by Karina Watts RN  Outcome: Progressing  Goal: Verbalize understanding of personal risk factors for fall in the hospital  11/8/2024 0505 by Karina Watts RN  Outcome: Progressing  11/8/2024 0504 by Karina Watts RN  Outcome: Progressing  Goal: Verbalize understanding  of risk factor reduction measures to prevent injury from fall in the home  11/8/2024 0505 by Karina Watts RN  Outcome: Progressing  11/8/2024 0504 by Karina Watts RN  Outcome: Progressing  Goal: Use assistive devices by end of the shift  11/8/2024 0505 by Karina Watts RN  Outcome: Progressing  11/8/2024 0504 by Karina Watts RN  Outcome: Progressing  Goal: Pace activities to prevent fatigue by end of the shift  11/8/2024 0505 by Karina Watts RN  Outcome: Progressing  11/8/2024 0504 by Karina Watts RN  Outcome: Progressing     Problem: General Stroke  Goal: Establish a mutual long term goal with patient by discharge  11/8/2024 0505 by Karina Watts RN  Outcome: Progressing  11/8/2024 0504 by Karina Watts RN  Outcome: Progressing  Goal: Demonstrate improvement in neurological exam throughout the shift  11/8/2024 0505 by Karina Watts RN  Outcome: Progressing  11/8/2024 0504 by Karina Watts RN  Outcome: Progressing  Goal: Maintain BP within ordered limits throughout shift  11/8/2024 0505 by Karina Watts RN  Outcome: Progressing  11/8/2024 0504 by Karina Watts RN  Outcome: Progressing  Goal: Participate in treatment (ie., meds, therapy) throughout shift  11/8/2024 0505 by Karina Watts RN  Outcome: Progressing  11/8/2024 0504 by Karina Watts RN  Outcome: Progressing  Goal: No symptoms of aspiration throughout shift  11/8/2024 0505 by Karina Watts RN  Outcome: Progressing  11/8/2024 0504 by Karina Watts RN  Outcome: Progressing  Goal: No symptoms of hemorrhage throughout shift  11/8/2024 0505 by Karina Watts RN  Outcome: Progressing  11/8/2024 0504 by Karina Watts RN  Outcome: Progressing  Goal: Tolerate enteral feeding throughout shift  11/8/2024 0505 by Karina Watts RN  Outcome: Progressing  11/8/2024 0504 by Karina Watts RN  Outcome: Progressing  Goal: Decreased nausea/vomiting throughout shift  11/8/2024 0505 by Karina Watts RN  Outcome:  Progressing  11/8/2024 0504 by Karina Watts RN  Outcome: Progressing  Goal: Controlled blood glucose throughout shift  11/8/2024 0505 by Karina Watts RN  Outcome: Progressing  11/8/2024 0504 by Karina Watts RN  Outcome: Progressing  Goal: Out of bed three times today  11/8/2024 0505 by Karina Watts RN  Outcome: Progressing  11/8/2024 0504 by Karina Watts RN  Outcome: Progressing

## 2024-11-08 NOTE — HOSPITAL COURSE
29-year-old male with past medical history of PVC presented for evaluation of dizziness.  Patient was admitted and had a CT head which was negative for any acute intracranial pathology.  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  Patient's symptoms are most likely related to peripheral vertigo.  Patient will be discharged with outpatient vestibular therapy and supportive care.  Patient is asked to hold the medication while with vestibular therapy.    28 minutes spent in discharge timing

## 2024-11-08 NOTE — DISCHARGE SUMMARY
Discharge Diagnosis  Nausea & vomiting    Issues Requiring Follow-Up  Follow-up with primary care provider and outpatient vestibular therapy    Discharge Meds     Medication List      START taking these medications     LORazepam 0.5 mg tablet; Commonly known as: Ativan; Take 1 tablet (0.5   mg) by mouth every 6 hours if needed (dizziness).   meclizine 25 mg tablet; Commonly known as: Antivert; Take 1 tablet (25   mg) by mouth 3 times a day as needed for dizziness.   ondansetron 4 mg tablet; Commonly known as: Zofran; Take 1 tablet (4 mg)   by mouth every 8 hours if needed for nausea or vomiting.   scopolamine 1 mg over 3 days patch 3 day; Commonly known as:   Transderm-Scop; Place 1 patch over 72 hours on the skin every 3rd day.;   Start taking on: November 11, 2024     CONTINUE taking these medications     magnesium oxide 400 mg tablet; Commonly known as: Mag-Ox       Test Results Pending At Discharge  Pending Labs       No current pending labs.            Hospital Course  29-year-old male with past medical history of PVC presented for evaluation of dizziness.  Patient was admitted and had a CT head which was negative for any acute intracranial pathology.  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  Patient's symptoms are most likely related to peripheral vertigo.  Patient will be discharged with outpatient vestibular therapy and supportive care.  Patient is asked to hold the medication while with vestibular therapy.    28 minutes spent in discharge timing    Pertinent Physical Exam At Time of Discharge  General: Alert, mild distress  HEENT: PERRLA, head intact and normocephalic  Neck: Normal to inspection  Lungs: Clear to auscultation, work of breathing within normal limit  Cardiac: Regular rate and rhythm  Abdomen: Soft nontender, positive bowel sounds  : Exam  deferred  Skin: Intact  Hematology: No petechia or excessive ecchymosis  Musculoskeletal: Without significant trauma  Neurological: Alert awake oriented, no focal deficit, cranial nerves grossly intact  Psych: No suicidal ideation or homicidal ideation  Outpatient Follow-Up  No future appointments.      Emilio Pickard MD

## 2024-11-08 NOTE — PROGRESS NOTES
11/08/24 1046   Discharge Planning   Living Arrangements Spouse/significant other   Support Systems Spouse/significant other   Assistance Needed None   Type of Residence Private residence   Home or Post Acute Services None   Expected Discharge Disposition Home   Does the patient need discharge transport arranged? No   Financial Resource Strain   How hard is it for you to pay for the very basics like food, housing, medical care, and heating? Not hard     Met with pt and his wife at bedside, pt states I can speak freely. The pt is independent, no dc needs. Has a ride home upon dc. PCP is Dr. Lewis at [facility]. Pharmacy is [facility], no barriers to obtaining medications noted. Pt denies difficulty with living expenses such as utilities/groceries/prescriptions.   
 PHARMACY STROKE RESPONSE      Patient Name: Ishaan Bose  MRN: [id number]  [facility]    Patient Weight (kg):   Wt Readings from Last 1 Encounters:   11/07/24 68 kg (150 lb)        An acute Brain Attack has been activated, pharmacy participated in multidisciplinary team bedside response for Ishaan Bose.  Contraindications for fibrinolytic therapy have been reviewed by pharmacy and any issues relating to medication therapy have been discussed directly with the provider(s) caring for this patient.     Pharmacy aided in the bedside response for fibrinolytic therapy. Patient did not receive fibrinolysis.    Orders Placed This Encounter      lactated Ringer's bolus 1,000 mL      meclizine (Antivert) tablet 25 mg      meclizine (Antivert) tablet 25 mg      ondansetron (Zofran) injection  - Omnicell Override Pull      Thank you for allowing me to take part in the care of this patient.     Carlos Bowden, PharmD  11/7/2024  12:56 PM         References:    Neurological [place] Stroke Tools   Neurological [place] IV Thrombolysis Checklist      
100% of the time

## 2024-11-12 LAB
ATRIAL RATE: 66 BPM
P AXIS: 52 DEGREES
P OFFSET: 199 MS
P ONSET: 142 MS
PR INTERVAL: 150 MS
Q ONSET: 217 MS
QRS COUNT: 10 BEATS
QRS DURATION: 96 MS
QT INTERVAL: 402 MS
QTC CALCULATION(BAZETT): 421 MS
QTC FREDERICIA: 415 MS
R AXIS: 67 DEGREES
T AXIS: 53 DEGREES
T OFFSET: 418 MS
VENTRICULAR RATE: 66 BPM